# Patient Record
Sex: MALE
[De-identification: names, ages, dates, MRNs, and addresses within clinical notes are randomized per-mention and may not be internally consistent; named-entity substitution may affect disease eponyms.]

---
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
## 2021-01-20 ENCOUNTER — APPOINTMENT (OUTPATIENT)
Dept: ORTHOPEDIC SURGERY | Facility: CLINIC | Age: 21
End: 2021-01-20
Payer: COMMERCIAL

## 2021-01-20 VITALS
HEART RATE: 71 BPM | WEIGHT: 140 LBS | HEIGHT: 66 IN | DIASTOLIC BLOOD PRESSURE: 73 MMHG | SYSTOLIC BLOOD PRESSURE: 134 MMHG | BODY MASS INDEX: 22.5 KG/M2

## 2021-01-20 DIAGNOSIS — Z78.9 OTHER SPECIFIED HEALTH STATUS: ICD-10-CM

## 2021-01-20 DIAGNOSIS — M25.531 PAIN IN RIGHT WRIST: ICD-10-CM

## 2021-01-20 PROCEDURE — 99072 ADDL SUPL MATRL&STAF TM PHE: CPT

## 2021-01-20 PROCEDURE — 99204 OFFICE O/P NEW MOD 45 MIN: CPT

## 2021-01-20 NOTE — HISTORY OF PRESENT ILLNESS
[de-identified] : RHD Patient is here for right wrist pain that began over a year ago. There was no inciting injury. He noticed it when exercising. He has increased pain when using his arms to push himself up from a seated position. He has done nothing to treat it. He saw a different provider for this about a month ago. Xrays were taken that were negative for fracture. Denies N/T/R/Prior injury. He is a student. \par \par The patient's past medical history, past surgical history, medications and allergies were reviewed by me today and documented accordingly. In addition, the patient's family and social history, which were noncontributory to this visit, were reviewed also. Intake form was reviewed. The patient has no family history of arthritis.

## 2021-01-20 NOTE — PHYSICAL EXAM
[de-identified] : Constitutional: Well-nourished, well-developed, No acute distress\par Respiratory:  Good respiratory effort, no SOB\par Lymphatic: No regional lymphadenopathy, no lymphedema\par Psychiatric: Pleasant and normal affect, alert and oriented x3\par Skin: Clean dry and intact B/L UE/LE\par Musculoskeletal: normal except where as noted in regional exam\par \par \par Left wrist:\par APPEARANCE: no marked deformities, no swelling or malalignment\par POSITIVE TENDERNESS: none\par NONTENDER: snuffbox, scapholunate, DRUJ, TFCC, FCU/FCR, ECU/ECRL/ECRB, radial/ulnar styloid, CMC, and MCP joints. \par ROM: full & painless. \par RESISTIVE TESTING: painless resisted wrist flexion/extension, and radial/ulnar deviation. \par SPECIAL TESTS: neg Finkelstein's. neg Phalen's. neg reverse Phalen's. neg Gonzales's. neg will test. neg TFCC grind. neg tinel's at the carpal tunnel\par Vasc: 2+ radial pulse\par Neuro: AIN, PIN, Ulnar nerve intact to motor\par Sensation: Intact to light touch throughout\par \par B/L Shoulders:  No asymmetry, malalignment, or swelling, Full ROM, 5/5 strength in flexion/ext, Abd/Add, IR/ER, Joints stable\par B/L Elbows:  No asymmetry, malalignment, or swelling, Full ROM, 5/5 strength in flex/ext, pronation/supination, Joints stable\par \par Right wrist:\par APPEARANCE: no swelling, no marked deformities or malalignment\par POSITIVE TENDERNESS: Scapholunate, dorsal DRUJ\par NONTENDER: snuffbox, TFCC, FCU/FCR, ECU/ECRL/ECRB, radial/ulnar styloid, CMC, and MCP joints.\par ROM: full & painless. \par RESISTIVE TESTING: painless resisted wrist flexion/extension, and radial/ulnar deviation. \par SPECIAL TESTS: neg Finkelstein's. neg Phalen's. neg reverse Phalen's. neg Gonzales's. neg will test. neg TFCC grind. neg tinel's at the carpal tunnel\par Vasc: 2+ radial pulse\par Neuro: AIN, PIN, Ulnar nerve intact to motor\par Sensation: Intact to light touch throughout\par  [de-identified] : I reviewed and clinically correlated the following outside imaging studies,\par I agree with the below noted radiology impressions.\par \par  RIGHT FOREARM XRAY\par HISTORY: M79.631 Right forearm pain\par AP and lateral view of the right forearm are obtained.\par The bony texture appears unremarkable. No destructive abnormalities or fractures are\par present. No radiographic abnormalities are demonstrated.\par IMPRESSION: Right forearm radiographs are normal. \par \par \par RIGHT WRIST XRAY 3 OR MORE VIEWS\par HISTORY: M25.531 Right wrist pain\par AP, oblique and lateral views of the right wrist are obtained.\par The bony texture appears unremarkable. No fracture, subluxation or dislocation is present.\par The joint spaces appear intact. No significant soft tissue abnormalities are demonstrated.\par IMPRESSION:\par Unremarkable right wrist examination. \par \par RIGHT HAND XRAY COMPLETE 3 OR MORE VIEWS\par HISTORY: M79.641 Right hand pain\par AP oblique and lateral views of the right hand are obtained.\par The bony texture appears unremarkable. There is no fracture or dislocation present. The\par joint spaces appear intact. No significant soft tissue abnormalities are demonstrated.\par IMPRESSION: There are no significant degenerative changes.\par There are no fractures, subluxations, dislocations nor foreign bodies.

## 2024-06-18 ENCOUNTER — NON-APPOINTMENT (OUTPATIENT)
Age: 24
End: 2024-06-18